# Patient Record
Sex: MALE | Race: ASIAN | NOT HISPANIC OR LATINO | URBAN - METROPOLITAN AREA
[De-identification: names, ages, dates, MRNs, and addresses within clinical notes are randomized per-mention and may not be internally consistent; named-entity substitution may affect disease eponyms.]

---

## 2021-07-01 ENCOUNTER — OFFICE VISIT (OUTPATIENT)
Dept: URBAN - METROPOLITAN AREA CLINIC 118 | Facility: CLINIC | Age: 41
End: 2021-07-01

## 2021-07-09 ENCOUNTER — OFFICE VISIT (OUTPATIENT)
Dept: URBAN - METROPOLITAN AREA CLINIC 70 | Facility: CLINIC | Age: 41
End: 2021-07-09
Payer: COMMERCIAL

## 2021-07-09 ENCOUNTER — WEB ENCOUNTER (OUTPATIENT)
Dept: URBAN - METROPOLITAN AREA CLINIC 70 | Facility: CLINIC | Age: 41
End: 2021-07-09

## 2021-07-09 VITALS
HEART RATE: 75 BPM | TEMPERATURE: 97.9 F | DIASTOLIC BLOOD PRESSURE: 86 MMHG | BODY MASS INDEX: 29.19 KG/M2 | SYSTOLIC BLOOD PRESSURE: 142 MMHG | WEIGHT: 192.6 LBS | HEIGHT: 68 IN

## 2021-07-09 DIAGNOSIS — K62.89 RECTAL PAIN: ICD-10-CM

## 2021-07-09 DIAGNOSIS — K59.00 COLONIC CONSTIPATION: ICD-10-CM

## 2021-07-09 DIAGNOSIS — K60.2 ANAL FISSURE: ICD-10-CM

## 2021-07-09 PROCEDURE — 99203 OFFICE O/P NEW LOW 30 MIN: CPT | Performed by: INTERNAL MEDICINE

## 2021-07-09 RX ORDER — DICLOFENAC SODIUM 10 MG/G
APPLY 4 GRAMS TOPICALLY TO AFFECTED AREA TWICE DAILY GEL TOPICAL
Qty: 100 | Refills: 1 | Status: ACTIVE | COMMUNITY

## 2021-07-09 RX ORDER — ATENOLOL 25 MG/1
TABLET ORAL
Qty: 90 | Status: ACTIVE | COMMUNITY

## 2021-07-09 RX ORDER — SILDENAFIL 50 MG/1
TAKE 1 TABLET BY MOUTH ONCE DAILY AS NEEDED 1 HOUR BEFORE SEXUAL ACTIVITY TABLET, FILM COATED ORAL
Qty: 4 | Refills: 0 | Status: ACTIVE | COMMUNITY

## 2021-07-09 RX ORDER — HYDROCHLOROTHIAZIDE 25 MG/1
TABLET ORAL
Qty: 90 | Status: ACTIVE | COMMUNITY

## 2021-07-09 RX ORDER — ATORVASTATIN CALCIUM 80 MG/1
TAKE 1 TABLET BY MOUTH EVERY DAY AT BEDTIME TABLET ORAL
Qty: 90 | Refills: 2 | Status: ACTIVE | COMMUNITY

## 2021-07-09 RX ORDER — TRAMADOL HYDROCHLORIDE 50 MG/1
TAKE 1 TABLET BY MOUTH EVERY 8 HOURS AS NEEDED TABLET, COATED ORAL
Qty: 21 | Refills: 3 | Status: ACTIVE | COMMUNITY

## 2021-07-09 RX ORDER — PANTOPRAZOLE SODIUM 40 MG/1
TAKE 1 BY MOUTH ONCE DAILY FOR 6 WEEKS TABLET, DELAYED RELEASE ORAL
Qty: 30 | Refills: 0 | Status: ACTIVE | COMMUNITY

## 2021-07-09 RX ORDER — PRASUGREL 10 MG/1
TABLET, FILM COATED ORAL
Qty: 90 | Status: ACTIVE | COMMUNITY

## 2021-07-09 RX ORDER — NITROGLYCERIN 0.4 MG/1
DISSOLVE ONE TABLET UNDER THE TONGUE EVERY 5 MINUTES AS NEEDED FOR CHEST PAIN. DO NOT EXCEED A TOTAL OF 3 DOSES IN 15 MINUTES TABLET SUBLINGUAL
Qty: 25 | Refills: 0 | Status: ACTIVE | COMMUNITY
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
## 2021-07-09 NOTE — PHYSICAL EXAM RECTAL:
No external lesions palpated.  STEPHY attempted but given degree of pain voiced not performed.  No signs of bleeding noted.

## 2021-07-23 ENCOUNTER — OFFICE VISIT (OUTPATIENT)
Dept: URBAN - METROPOLITAN AREA CLINIC 70 | Facility: CLINIC | Age: 41
End: 2021-07-23
Payer: COMMERCIAL

## 2021-07-23 ENCOUNTER — WEB ENCOUNTER (OUTPATIENT)
Dept: URBAN - METROPOLITAN AREA CLINIC 70 | Facility: CLINIC | Age: 41
End: 2021-07-23

## 2021-07-23 VITALS
HEIGHT: 68 IN | WEIGHT: 190.8 LBS | BODY MASS INDEX: 28.92 KG/M2 | SYSTOLIC BLOOD PRESSURE: 127 MMHG | HEART RATE: 82 BPM | DIASTOLIC BLOOD PRESSURE: 84 MMHG | TEMPERATURE: 98.1 F

## 2021-07-23 DIAGNOSIS — K60.2 ANAL FISSURE: ICD-10-CM

## 2021-07-23 DIAGNOSIS — K59.00 COLONIC CONSTIPATION: ICD-10-CM

## 2021-07-23 DIAGNOSIS — K62.89 RECTAL PAIN: ICD-10-CM

## 2021-07-23 PROCEDURE — 99213 OFFICE O/P EST LOW 20 MIN: CPT | Performed by: INTERNAL MEDICINE

## 2021-07-23 RX ORDER — SILDENAFIL 50 MG/1
TAKE 1 TABLET BY MOUTH ONCE DAILY AS NEEDED 1 HOUR BEFORE SEXUAL ACTIVITY TABLET, FILM COATED ORAL
Qty: 4 | Refills: 0 | Status: ACTIVE | COMMUNITY

## 2021-07-23 RX ORDER — PANTOPRAZOLE SODIUM 40 MG/1
TAKE 1 BY MOUTH ONCE DAILY FOR 6 WEEKS TABLET, DELAYED RELEASE ORAL
Qty: 30 | Refills: 0 | Status: ACTIVE | COMMUNITY

## 2021-07-23 RX ORDER — PRASUGREL 10 MG/1
TABLET, FILM COATED ORAL
Qty: 90 | Status: ACTIVE | COMMUNITY

## 2021-07-23 RX ORDER — ATORVASTATIN CALCIUM 80 MG/1
TAKE 1 TABLET BY MOUTH EVERY DAY AT BEDTIME TABLET ORAL
Qty: 90 | Refills: 2 | Status: ACTIVE | COMMUNITY

## 2021-07-23 RX ORDER — ATENOLOL 25 MG/1
TABLET ORAL
Qty: 90 | Status: ACTIVE | COMMUNITY

## 2021-07-23 RX ORDER — NITROGLYCERIN 0.4 MG/1
DISSOLVE ONE TABLET UNDER THE TONGUE EVERY 5 MINUTES AS NEEDED FOR CHEST PAIN. DO NOT EXCEED A TOTAL OF 3 DOSES IN 15 MINUTES TABLET SUBLINGUAL
Qty: 25 | Refills: 0 | Status: ACTIVE | COMMUNITY

## 2021-07-23 RX ORDER — TRAMADOL HYDROCHLORIDE 50 MG/1
TAKE 1 TABLET BY MOUTH EVERY 8 HOURS AS NEEDED TABLET, COATED ORAL
Qty: 21 | Refills: 3 | Status: ACTIVE | COMMUNITY

## 2021-07-23 RX ORDER — HYDROCHLOROTHIAZIDE 25 MG/1
TABLET ORAL
Qty: 90 | Status: ACTIVE | COMMUNITY

## 2021-07-23 RX ORDER — DICLOFENAC SODIUM 10 MG/G
APPLY 4 GRAMS TOPICALLY TO AFFECTED AREA TWICE DAILY GEL TOPICAL
Qty: 100 | Refills: 1 | Status: ACTIVE | COMMUNITY
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
## 2021-07-23 NOTE — HPI-TODAY'S VISIT:
Patient presents today for follow up in regards to anal fissure.  Started on Nitroglycerin/Lidocaine compound cream for treatment.  Currently voicing improvement in complaints of rectal pain and pressure.  Started taking fiber gummies daily, which has helped improve stool consistency.

## 2021-09-27 ENCOUNTER — TELEPHONE ENCOUNTER (OUTPATIENT)
Dept: URBAN - METROPOLITAN AREA CLINIC 70 | Facility: CLINIC | Age: 41
End: 2021-09-27

## 2021-09-28 ENCOUNTER — OFFICE VISIT (OUTPATIENT)
Dept: URBAN - METROPOLITAN AREA CLINIC 70 | Facility: CLINIC | Age: 41
End: 2021-09-28
Payer: COMMERCIAL

## 2021-09-28 ENCOUNTER — LAB OUTSIDE AN ENCOUNTER (OUTPATIENT)
Dept: URBAN - METROPOLITAN AREA CLINIC 70 | Facility: CLINIC | Age: 41
End: 2021-09-28

## 2021-09-28 ENCOUNTER — TELEPHONE ENCOUNTER (OUTPATIENT)
Dept: URBAN - METROPOLITAN AREA CLINIC 70 | Facility: CLINIC | Age: 41
End: 2021-09-28

## 2021-09-28 VITALS
WEIGHT: 192.4 LBS | DIASTOLIC BLOOD PRESSURE: 97 MMHG | SYSTOLIC BLOOD PRESSURE: 156 MMHG | HEIGHT: 68 IN | BODY MASS INDEX: 29.16 KG/M2 | TEMPERATURE: 97.7 F | HEART RATE: 75 BPM

## 2021-09-28 DIAGNOSIS — K59.09 CHANGE IN BOWEL MOVEMENTS INTERMITTENT CONSTIPATION. URGENCY IN THE MORNING.: ICD-10-CM

## 2021-09-28 DIAGNOSIS — K62.89 RECTAL PAIN: ICD-10-CM

## 2021-09-28 PROCEDURE — 99213 OFFICE O/P EST LOW 20 MIN: CPT | Performed by: INTERNAL MEDICINE

## 2021-09-28 RX ORDER — TRAMADOL HYDROCHLORIDE 50 MG/1
TAKE 1 TABLET BY MOUTH EVERY 8 HOURS AS NEEDED TABLET, COATED ORAL
Qty: 21 | Refills: 3 | Status: ON HOLD | COMMUNITY

## 2021-09-28 RX ORDER — ATORVASTATIN CALCIUM 80 MG/1
TAKE 1 TABLET BY MOUTH EVERY DAY AT BEDTIME TABLET ORAL
Qty: 90 | Refills: 2 | Status: ACTIVE | COMMUNITY

## 2021-09-28 RX ORDER — DICLOFENAC SODIUM 10 MG/G
APPLY 4 GRAMS TOPICALLY TO AFFECTED AREA TWICE DAILY GEL TOPICAL
Qty: 100 | Refills: 1 | Status: ON HOLD | COMMUNITY

## 2021-09-28 RX ORDER — SILDENAFIL 50 MG/1
TAKE 1 TABLET BY MOUTH ONCE DAILY AS NEEDED 1 HOUR BEFORE SEXUAL ACTIVITY TABLET, FILM COATED ORAL
Qty: 4 | Refills: 0 | Status: ACTIVE | COMMUNITY

## 2021-09-28 RX ORDER — PRASUGREL 10 MG/1
TABLET, FILM COATED ORAL
Qty: 90 | Status: ACTIVE | COMMUNITY

## 2021-09-28 RX ORDER — OMEGA-3S/DHA/EPA/FISH OIL 1000-1400
AS DIRECTED CAPSULE,DELAYED RELEASE (ENTERIC COATED) ORAL
Status: ACTIVE | COMMUNITY

## 2021-09-28 RX ORDER — NITROGLYCERIN 0.4 MG/1
DISSOLVE ONE TABLET UNDER THE TONGUE EVERY 5 MINUTES AS NEEDED FOR CHEST PAIN. DO NOT EXCEED A TOTAL OF 3 DOSES IN 15 MINUTES TABLET SUBLINGUAL
Qty: 25 | Refills: 0 | Status: ACTIVE | COMMUNITY

## 2021-09-28 RX ORDER — PANTOPRAZOLE SODIUM 40 MG/1
TAKE 1 BY MOUTH ONCE DAILY FOR 6 WEEKS TABLET, DELAYED RELEASE ORAL
Qty: 30 | Refills: 0 | Status: ACTIVE | COMMUNITY

## 2021-09-28 RX ORDER — HYDROCHLOROTHIAZIDE 25 MG/1
TABLET ORAL
Qty: 90 | Status: ACTIVE | COMMUNITY

## 2021-09-28 RX ORDER — IBUPROFEN 800 MG/1
1 TABLET WITH FOOD OR MILK AS NEEDED TABLET ORAL BID
Qty: 14 TABLET | Refills: 0 | OUTPATIENT
Start: 2021-09-28 | End: 2021-10-05

## 2021-09-28 RX ORDER — TRAMADOL HYDROCHLORIDE 50 MG/1
1 TABLET AS NEEDED TABLET, FILM COATED ORAL
Qty: 10 | Refills: 0 | OUTPATIENT
Start: 2021-09-28 | End: 2021-10-03

## 2021-09-28 RX ORDER — ATENOLOL 25 MG/1
TABLET ORAL
Qty: 90 | Status: ACTIVE | COMMUNITY

## 2021-09-28 NOTE — HPI-TODAY'S VISIT:
Patient presents today with return in c/o rectal pain that has been occuring intermittently since LOV.  Feels symptoms are due to his work as a .  Feels pain is worse to left side rectum.  Pain is described as a sharp, stabbing burning pain.  Pain is aggravated with defecation.  Defecation occurs about 1-2 times a day with stools described as being soft.  Warm soaks has helped to improve pain.   Was suspected to have anal fissure and prescribed Nitroglycerin 0.2% with 2% lidocaine compound cream at [facility], which helped intially.   Denies prior signs of GI blood loss, changes in bowel habits or undergoing prior colonoscopy. 31-40 (obesity)

## 2021-09-28 NOTE — HPI-OTHER HISTORIES
--Office note from [facility] 07/23/2021: Patient presents today for follow up in regards to anal fissure.  Started on Nitroglycerin/Lidocaine compound cream for treatment.  Currently voicing improvement in complaints of rectal pain and pressure.  Started taking fiber gummies daily, which has helped improve stool consistency.

## 2021-10-06 ENCOUNTER — OFFICE VISIT (OUTPATIENT)
Dept: URBAN - METROPOLITAN AREA SURGERY CENTER 24 | Facility: SURGERY CENTER | Age: 41
End: 2021-10-06
Payer: COMMERCIAL

## 2021-10-06 DIAGNOSIS — K59.09 CHANGE IN BOWEL MOVEMENTS INTERMITTENT CONSTIPATION. URGENCY IN THE MORNING.: ICD-10-CM

## 2021-10-06 DIAGNOSIS — K63.89 BACTERIAL OVERGROWTH SYNDROME: ICD-10-CM

## 2021-10-06 DIAGNOSIS — K62.89 ACUTE PROCTITIS: ICD-10-CM

## 2021-10-06 PROCEDURE — G8907 PT DOC NO EVENTS ON DISCHARG: HCPCS | Performed by: INTERNAL MEDICINE

## 2021-10-06 PROCEDURE — 45380 COLONOSCOPY AND BIOPSY: CPT | Performed by: INTERNAL MEDICINE

## 2021-10-19 ENCOUNTER — OFFICE VISIT (OUTPATIENT)
Dept: URBAN - METROPOLITAN AREA CLINIC 70 | Facility: CLINIC | Age: 41
End: 2021-10-19

## 2021-11-04 ENCOUNTER — TELEPHONE ENCOUNTER (OUTPATIENT)
Dept: URBAN - METROPOLITAN AREA CLINIC 70 | Facility: CLINIC | Age: 41
End: 2021-11-04

## 2021-12-28 ENCOUNTER — TELEPHONE ENCOUNTER (OUTPATIENT)
Dept: URBAN - METROPOLITAN AREA CLINIC 70 | Facility: CLINIC | Age: 41
End: 2021-12-28

## 2021-12-29 ENCOUNTER — TELEPHONE ENCOUNTER (OUTPATIENT)
Dept: URBAN - METROPOLITAN AREA CLINIC 70 | Facility: CLINIC | Age: 41
End: 2021-12-29

## 2022-02-15 ENCOUNTER — OFFICE VISIT (OUTPATIENT)
Dept: URBAN - METROPOLITAN AREA CLINIC 70 | Facility: CLINIC | Age: 42
End: 2022-02-15
Payer: COMMERCIAL

## 2022-02-15 DIAGNOSIS — K59.00 COLONIC CONSTIPATION: ICD-10-CM

## 2022-02-15 DIAGNOSIS — K60.2 ANAL FISSURE: ICD-10-CM

## 2022-02-15 PROCEDURE — 99214 OFFICE O/P EST MOD 30 MIN: CPT | Performed by: NURSE PRACTITIONER

## 2022-02-15 RX ORDER — HYDROCHLOROTHIAZIDE 25 MG/1
TABLET ORAL
Qty: 90 | Status: ACTIVE | COMMUNITY

## 2022-02-15 RX ORDER — TRAMADOL HYDROCHLORIDE 50 MG/1
TAKE 1 TABLET BY MOUTH EVERY 8 HOURS AS NEEDED TABLET, COATED ORAL
Qty: 21 | Refills: 3 | Status: ACTIVE | COMMUNITY

## 2022-02-15 RX ORDER — ATENOLOL 25 MG/1
TABLET ORAL
Qty: 90 | Status: ACTIVE | COMMUNITY

## 2022-02-15 RX ORDER — SILDENAFIL 50 MG/1
TAKE 1 TABLET BY MOUTH ONCE DAILY AS NEEDED 1 HOUR BEFORE SEXUAL ACTIVITY TABLET, FILM COATED ORAL
Qty: 4 | Refills: 0 | Status: ACTIVE | COMMUNITY

## 2022-02-15 RX ORDER — PRASUGREL 10 MG/1
TABLET, FILM COATED ORAL
Qty: 90 | Status: ACTIVE | COMMUNITY

## 2022-02-15 RX ORDER — OMEGA-3S/DHA/EPA/FISH OIL 1000-1400
AS DIRECTED CAPSULE,DELAYED RELEASE (ENTERIC COATED) ORAL
Status: ACTIVE | COMMUNITY

## 2022-02-15 RX ORDER — ATORVASTATIN CALCIUM 80 MG/1
TAKE 1 TABLET BY MOUTH EVERY DAY AT BEDTIME TABLET ORAL
Qty: 90 | Refills: 2 | Status: ACTIVE | COMMUNITY

## 2022-02-15 RX ORDER — DICLOFENAC SODIUM 10 MG/G
APPLY 4 GRAMS TOPICALLY TO AFFECTED AREA TWICE DAILY GEL TOPICAL
Qty: 100 | Refills: 1 | Status: ACTIVE | COMMUNITY

## 2022-02-15 RX ORDER — LACTULOSE 10 G/15ML
TAKE 15 ML SOLUTION ORAL
Qty: 900 ML | Refills: 5 | OUTPATIENT
Start: 2022-02-15 | End: 2022-08-14

## 2022-02-15 RX ORDER — PANTOPRAZOLE SODIUM 40 MG/1
TAKE 1 BY MOUTH ONCE DAILY FOR 6 WEEKS TABLET, DELAYED RELEASE ORAL
Qty: 30 | Refills: 0 | Status: ACTIVE | COMMUNITY

## 2022-02-15 RX ORDER — NITROGLYCERIN 0.4 MG/1
DISSOLVE ONE TABLET UNDER THE TONGUE EVERY 5 MINUTES AS NEEDED FOR CHEST PAIN. DO NOT EXCEED A TOTAL OF 3 DOSES IN 15 MINUTES TABLET SUBLINGUAL
Qty: 25 | Refills: 0 | Status: ACTIVE | COMMUNITY

## 2022-02-15 NOTE — PHYSICAL EXAM GASTROINTESTINAL
Abdomen , soft, nontender, nondistended , no guarding or rigidity , no masses palpable , normal bowel sounds , Liver and Spleen: no hepatosplenomegaly
supple/no JVD

## 2022-02-15 NOTE — HPI-OTHER HISTORIES
------Last office note 09/28/2021: Patient presents today with return in c/o rectal pain that has been occuring intermittently since LOV.  Feels symptoms are due to his work as a .  Feels pain is worse to left side rectum.  Pain is described as a sharp, stabbing burning pain.  Pain is aggravated with defecation.  Defecation occurs about 1-2 times a day with stools described as being soft.  Warm soaks has helped to improve pain.   Was suspected to have anal fissure and prescribed Nitroglycerin 0.2% with 2% lidocaine compound cream at [facility], which helped intially.   Denies prior signs of GI blood loss, changes in bowel habits or undergoing prior colonoscopy.

## 2022-03-17 ENCOUNTER — DASHBOARD ENCOUNTERS (OUTPATIENT)
Age: 42
End: 2022-03-17

## 2022-03-17 ENCOUNTER — OFFICE VISIT (OUTPATIENT)
Dept: URBAN - METROPOLITAN AREA CLINIC 70 | Facility: CLINIC | Age: 42
End: 2022-03-17
Payer: COMMERCIAL

## 2022-03-17 VITALS
WEIGHT: 192.5 LBS | SYSTOLIC BLOOD PRESSURE: 154 MMHG | DIASTOLIC BLOOD PRESSURE: 100 MMHG | BODY MASS INDEX: 29.18 KG/M2 | HEART RATE: 83 BPM | TEMPERATURE: 98.1 F | HEIGHT: 68 IN

## 2022-03-17 DIAGNOSIS — K60.2 ANAL FISSURE: ICD-10-CM

## 2022-03-17 DIAGNOSIS — K59.00 COLONIC CONSTIPATION: ICD-10-CM

## 2022-03-17 PROBLEM — 35298007: Status: ACTIVE | Noted: 2021-07-09

## 2022-03-17 PROCEDURE — 99213 OFFICE O/P EST LOW 20 MIN: CPT | Performed by: NURSE PRACTITIONER

## 2022-03-17 RX ORDER — TRAMADOL HYDROCHLORIDE 50 MG/1
TAKE 1 TABLET BY MOUTH EVERY 8 HOURS AS NEEDED TABLET, COATED ORAL
Qty: 21 | Refills: 3 | Status: ACTIVE | COMMUNITY

## 2022-03-17 RX ORDER — LACTULOSE 10 G/15ML
TAKE 15 ML SOLUTION ORAL
OUTPATIENT
Start: 2022-02-15

## 2022-03-17 RX ORDER — ATORVASTATIN CALCIUM 80 MG/1
TAKE 1 TABLET BY MOUTH EVERY DAY AT BEDTIME TABLET ORAL
Qty: 90 | Refills: 2 | Status: ACTIVE | COMMUNITY

## 2022-03-17 RX ORDER — NITROGLYCERIN 0.4 MG/1
DISSOLVE ONE TABLET UNDER THE TONGUE EVERY 5 MINUTES AS NEEDED FOR CHEST PAIN. DO NOT EXCEED A TOTAL OF 3 DOSES IN 15 MINUTES TABLET SUBLINGUAL
Qty: 25 | Refills: 0 | Status: ACTIVE | COMMUNITY

## 2022-03-17 RX ORDER — OMEGA-3S/DHA/EPA/FISH OIL 1000-1400
AS DIRECTED CAPSULE,DELAYED RELEASE (ENTERIC COATED) ORAL
Status: ACTIVE | COMMUNITY

## 2022-03-17 RX ORDER — ATENOLOL 25 MG/1
TABLET ORAL
Qty: 90 | Status: ACTIVE | COMMUNITY

## 2022-03-17 RX ORDER — HYDROCHLOROTHIAZIDE 25 MG/1
TABLET ORAL
Qty: 90 | Status: ACTIVE | COMMUNITY

## 2022-03-17 RX ORDER — LACTULOSE 10 G/15ML
TAKE 15 ML SOLUTION ORAL
Qty: 900 ML | Refills: 5 | Status: ACTIVE | COMMUNITY
Start: 2022-02-15 | End: 2022-08-14

## 2022-03-17 RX ORDER — PRASUGREL 10 MG/1
TABLET, FILM COATED ORAL
Qty: 90 | Status: ACTIVE | COMMUNITY

## 2022-03-17 RX ORDER — PANTOPRAZOLE SODIUM 40 MG/1
TAKE 1 BY MOUTH ONCE DAILY FOR 6 WEEKS TABLET, DELAYED RELEASE ORAL
Qty: 30 | Refills: 0 | Status: ACTIVE | COMMUNITY

## 2022-03-17 RX ORDER — SILDENAFIL 50 MG/1
TAKE 1 TABLET BY MOUTH ONCE DAILY AS NEEDED 1 HOUR BEFORE SEXUAL ACTIVITY TABLET, FILM COATED ORAL
Qty: 4 | Refills: 0 | Status: ACTIVE | COMMUNITY

## 2022-03-17 RX ORDER — DICLOFENAC SODIUM 10 MG/G
APPLY 4 GRAMS TOPICALLY TO AFFECTED AREA TWICE DAILY GEL TOPICAL
Qty: 100 | Refills: 1 | Status: ACTIVE | COMMUNITY

## 2022-03-17 NOTE — HPI-TODAY'S VISIT:
Patient presents for follow up in regards to anal fissure and constipation.  Using Nitro/lidocaine cream daily has helped to improve rectal pain.  Was also prescribed lactulose at [facility] to help with constipation.  Taking medication provides a complete sensation of evacuation.  Feels control of bowel habits has helped to improve rectal pain as well.  Missed one dose and noticed slight return in rectal pain.  Overall, is without GI complaints.
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
## 2022-05-13 ENCOUNTER — TELEPHONE ENCOUNTER (OUTPATIENT)
Dept: URBAN - METROPOLITAN AREA CLINIC 70 | Facility: CLINIC | Age: 42
End: 2022-05-13